# Patient Record
Sex: MALE
[De-identification: names, ages, dates, MRNs, and addresses within clinical notes are randomized per-mention and may not be internally consistent; named-entity substitution may affect disease eponyms.]

---

## 2018-08-14 ENCOUNTER — HOSPITAL ENCOUNTER (EMERGENCY)
Dept: HOSPITAL 14 - H.ER | Age: 38
Discharge: HOME | End: 2018-08-14
Payer: COMMERCIAL

## 2018-08-14 VITALS
TEMPERATURE: 98.2 F | SYSTOLIC BLOOD PRESSURE: 132 MMHG | OXYGEN SATURATION: 100 % | DIASTOLIC BLOOD PRESSURE: 80 MMHG | HEART RATE: 81 BPM

## 2018-08-14 VITALS — RESPIRATION RATE: 16 BRPM

## 2018-08-14 DIAGNOSIS — Z23: ICD-10-CM

## 2018-08-14 DIAGNOSIS — S06.9X0A: ICD-10-CM
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
## 2018-08-14 DIAGNOSIS — S02.80XA: Primary | ICD-10-CM

## 2018-08-14 DIAGNOSIS — H11.30: ICD-10-CM
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
## 2018-08-14 DIAGNOSIS — S00.83XA: ICD-10-CM

## 2018-08-14 DIAGNOSIS — S00.11XA: ICD-10-CM

## 2018-08-14 NOTE — CT
Date of service: 



08/14/2018



PROCEDURE:  CT MAXILLOFACIAL BONES WITHOUT CONTRAST



HISTORY:

s/p head injury playing basketball



COMPARISON:

August 14, 2018. CT head.



TECHNIQUE:

Contiguous axial CT  images of the maxillofacial bones were obtained. 

Coronal and sagittal reformats were generated.



Radiation dose:



Total exam DLP =  750.52 mGy-cm.



This CT exam was performed using one or more of the following dose 

reduction techniques: Automated exposure control, adjustment of the 

mA and/or kV according to patient size, and/or use of iterative 

reconstruction technique.



FINDINGS:



NASAL BONES:

Acute right nasal bone fracture.



There is deviation of the bony nasal septum and there may be a small 

fracture. 



There is nasal turbinates thickening indicative of blood. 



ORBITS:

No evidence of ocular/globe abnormality 



Fractures through the medial wall of the right orbit. 



Multiple fractures including a large free-floating fragment medial 

inferior aspect of the right orbital wall. 



There is no evidence of entrapment of the inferior rectus muscle 

which appears to be in anatomic position in there is a tissue plane 

between the inferior rectus muscle enlarged partially detached 

fragment of the adjacent medial and inferior orbital wall. 



Evidence of subtle nondisplaced fracture through the lateral wall of 

the right orbit 



Focal air identified in the retro Conal region adjacent to the optic 

nerve and this appears to indicate communication between nasal cavity 

and the right orbit. 



PARANASAL SINUSES/ MASTOIDS:

Fluid/ blood identified in right ethmoid air cells. Blood identified 

in the right maxillary sinus. Layering of fluid/ blood in the frontal 

sinus on the right side.



MAXILLA:

Unremarkable.



MANDIBLE/ TEMPOROMANDIBULAR JOINTS:

Unremarkable.



SKULL BASE:

Fractures identified through the lamina papyracea.



Medial and lateral pterygoid plates and adjacent musculature are 

unremarkable, unaffected by these fractures. 



TEMPORAL BONES:

Middle ears and mastoid grossly unremarkable.



OTHER FINDINGS:

None.



IMPRESSION:

Complex fractures including the medial wall of the right orbit, the 

inferior wall of the right orbit.  Fractures extend through the right 

nasal bone, possibly the bony nasal septum.  Fractures through the 

lamina papyracea identified. In addition, there is a nondisplaced 

fracture of the lateral wall of the right orbit. 



Blood identified nasal turbinates, right maxillary sinus, right 

ethmoid air cells extending through the frontoethmoidal recess with 

fluid/ blood in the right frontal sinus.

## 2018-08-14 NOTE — CT
Date of service: 



08/14/2018



PROCEDURE:  CT HEAD WITHOUT CONTRAST.



HISTORY:

s/p head injury playing basketball



COMPARISON:

05/23/2012.



TECHNIQUE:

Axial computed tomography images were obtained through the head/brain 

without intravenous contrast.  



Radiation dose:



Total exam DLP = 799.22 mGy-cm.



This CT exam was performed using one or more of the following dose 

reduction techniques: Automated exposure control, adjustment of the 

mA and/or kV according to patient size, and/or use of iterative 

reconstruction technique.



FINDINGS:



HEMORRHAGE:

No intracranial hemorrhage. 



BRAIN:

No mass effect or edema.  No atrophy or chronic microvascular 

ischemic changes.



VENTRICLES:

Unremarkable. No hydrocephalus. 



CALVARIUM:

Unremarkable.



PARANASAL SINUSES:

Unremarkable as visualized. No significant inflammatory changes.



MASTOID AIR CELLS:

Unremarkable as visualized. No inflammatory changes.



OTHER FINDINGS:

Complex facial fractures which are incompletely visualized.  These 

involve the floor of the right orbit, the medial wall of the right 

orbit. Fractures of the lamina papyracea also identified. 



Fluid/blood identified in maxillary sinus on the right as well as the 

ethmoid air cells and right frontal air cells and frontoethmoidal 

recess region. 



IMPRESSION:

No acute intracranial abnormalities. No significant findings to 

account for the clinical presentation.  Complex right periorbital and 

facial fractures identified although there are incompletely visualized

## 2018-08-14 NOTE — ED PDOC
HPI: Eye Injury/Pain


Time Seen by Provider: 18 17:54


Chief Complaint (Nursing): Eye Problem


Chief Complaint (Provider): Right Eye Pain and Right Sided facial Pain


History Per:  (70460)


History/Exam Limitations: no limitations


Onset/Duration Of Symptoms: Days


Current Symptoms Are (Timing): Still Present


Injury To Eye?: No


Severity: None


Quality: "Pain"


Wears Contact Lens?: No


Associated Symptoms: Pain.  denies: FB Sensation, Discharge From Eye


Additional Complaint(s): 


37 year old male presents to the emergency department to be evaluated for right 

sided facial pain and right eye pain. Patient reports that on  while 

playing basketball he was elbowed in the face and has been experiencing 

worsening pain and swelling ever since. He further states that he has been 

experiencing intermittent blurry vision from his right eye. Patient also notes 

mild headache associated with symptoms. Denies loss of consciousness, dizziness

, nausea, vomiting, foreign body sensation to right eye. No medications were 

taken prior to arrival.





NO PRIMARY CARE PROVIDER








Past Medical History


Reviewed: Historical Data, Nursing Documentation, Vital Signs


Vital Signs: 


 Last Vital Signs











Temp  99.4 F   18 17:50


 


Pulse  84   18 17:50


 


Resp  16   18 17:50


 


BP  147/90   18 17:50


 


Pulse Ox  98   18 17:50














- Medical History


PMH: Gastritis





- Surgical History


Surgical History: No Surg Hx





- Family History


Family History: States: Unknown Family Hx





- Social History


Current smoker - smoking cessation education provided: No


Alcohol: Social


Drugs: Denies





- Home Medications


Home Medications: 


 Ambulatory Orders











 Medication  Instructions  Recorded


 


Acetaminophen [Acetaminophen 8 650 mg PO Q8 PRN #21 tablet.er 18





Hour]  


 


traMADol [Ultram] 50 mg PO TID PRN #12 tab 18














- Allergies


Allergies/Adverse Reactions: 


 Allergies











Allergy/AdvReac Type Severity Reaction Status Date / Time


 


No Known Allergies Allergy   Verified 18 17:50














Review of Systems


Constitutional: Positive for: Other (right sided facial pain)


Eyes: Positive for: Pain (right eye )


Gastrointestinal: Negative for: Nausea, Vomiting


Neurological: Negative for: Dizziness





Physical Exam





- Reviewed


Nursing Documentation Reviewed: Yes


Vital Signs Reviewed: Yes





- Physical Exam


Comments: 


GENERAL APPEARANCE: Patient is awake, alert, oriented x 3, in no acute 

distress. 


SKIN:  Warm, dry; (-) cyanosis; (-) rash.


HEAD:  (-) scalp swelling or tenderness, (-) temporal artery tenderness.


EYES:  (-) conjunctival pallor, (-) scleral icterus, diffuse ecchymosis and 

tenderness of right eye orbit, conjunctival hyphemia to the lateral sclera of 

right eye, (-) fluorescein uptake.


ENMT:  (-) sinus tenderness; mucous membranes are moist, right maxillary 

tenderness, full ROM of jaw, dentition intact and non tender, pharynx clear, 

uvual midline


NECK:  (-) tenderness, (-) stiffness, (-) meningismus, (-) lymphadenopathy.


CHEST AND RESPIRATORY:  (-) rales, (-) rhonchi, (-) wheezes; breath sounds 

equal bilaterally.


HEART AND CARDIOVASCULAR:  (-) irregularity; (-) murmur, (-) gallop.


ABDOMEN AND GI:  Soft; (-) tenderness.


EXTREMITIES:  (-) deformity.


NEURO AND PSYCH:  Mental status as above.  CNs:  Pupils equal and reactive; EOMI

; (-) facial asymmetry; tongue and uvula midline.  Strength and DTRs symmetric. 








- ECG


O2 Sat by Pulse Oximetry: 98 (RA)


Pulse Ox Interpretation: Normal





Medical Decision Making


Medical Decision Makin


Initial Impression


37 year old male presenting with closed head injury, facial contusion r/o 

fracture 


Initial Plan:


* CT head w/o Contrast 


* CT maxillofacial w/o contrast 


* Adacel 0.5 ml 


* Tylenol 650 mg PO


* Flucaine eye drops 1 drop OD


* Reevaluation


 





Date of service: 


2018


PROCEDURE:  CT HEAD WITHOUT CONTRAST.


HISTORY:


s/p head injury playing basketball


COMPARISON:


2012.


TECHNIQUE:


Axial computed tomography images were obtained through the head/brain without 

intravenous contrast.  


Radiation dose:


Total exam DLP = 799.22 mGy-cm.


This CT exam was performed using one or more of the following dose reduction 

techniques: Automated exposure control, adjustment of the mA and/or kV 

according to patient size, and/or use of iterative reconstruction technique.


FINDINGS:


HEMORRHAGE:


No intracranial hemorrhage. 


BRAIN:


No mass effect or edema.  No atrophy or chronic microvascular ischemic changes.


VENTRICLES:


Unremarkable. No hydrocephalus. 


CALVARIUM:


Unremarkable.


PARANASAL SINUSES:


Unremarkable as visualized. No significant inflammatory changes.


MASTOID AIR CELLS:


Unremarkable as visualized. No inflammatory changes.


OTHER FINDINGS:


Complex facial fractures which are incompletely visualized.  These involve the 

floor of the right orbit, the medial wall of the right orbit. Fractures of the 

lamina papyracea also identified. 


Fluid/blood identified in maxillary sinus on the right as well as the ethmoid 

air cells and right frontal air cells and frontoethmoidal recess region. 


IMPRESSION:


No acute intracranial abnormalities. No significant findings to account for the 

clinical presentation.  Complex right periorbital and facial fractures 

identified although there are incompletely visualized





Date of service: 


2018


PROCEDURE:  CT MAXILLOFACIAL BONES WITHOUT CONTRAST


HISTORY:


s/p head injury playing basketball


COMPARISON:


2018. CT head.


TECHNIQUE:


Contiguous axial CT  images of the maxillofacial bones were obtained. Coronal 

and sagittal reformats were generated.


Radiation dose:


Total exam DLP =  750.52 mGy-cm.


This CT exam was performed using one or more of the following dose reduction 

techniques: Automated exposure control, adjustment of the mA and/or kV 

according to patient size, and/or use of iterative reconstruction technique.


FINDINGS:


NASAL BONES:


Acute right nasal bone fracture.


There is deviation of the bony nasal septum and there may be a small fracture. 


There is nasal turbinates thickening indicative of blood. 


ORBITS:


No evidence of ocular/globe abnormality 


Fractures through the medial wall of the right orbit. 


Multiple fractures including a large free-floating fragment medial inferior 

aspect of the right orbital wall. 


There is no evidence of entrapment of the inferior rectus muscle which appears 

to be in anatomic position in there is a tissue plane between the inferior 

rectus muscle enlarged partially detached fragment of the adjacent medial and 

inferior orbital wall. 


Evidence of subtle nondisplaced fracture through the lateral wall of the right 

orbit 


Focal air identified in the retro Conal region adjacent to the optic nerve and 

this appears to indicate communication between nasal cavity and the right 

orbit. 


PARANASAL SINUSES/ MASTOIDS:


Fluid/ blood identified in right ethmoid air cells. Blood identified in the 

right maxillary sinus. Layering of fluid/ blood in the frontal sinus on the 

right side.


MAXILLA:


Unremarkable.


MANDIBLE/ TEMPOROMANDIBULAR JOINTS:


Unremarkable.


SKULL BASE:


Fractures identified through the lamina papyracea.


Medial and lateral pterygoid plates and adjacent musculature are unremarkable, 

unaffected by these fractures. 


TEMPORAL BONES:


Middle ears and mastoid grossly unremarkable.


OTHER FINDINGS:


None.


IMPRESSION:


Complex fractures including the medial wall of the right orbit, the inferior 

wall of the right orbit.  Fractures extend through the right nasal bone, 

possibly the bony nasal septum.  Fractures through the lamina papyracea 

identified. In addition, there is a nondisplaced fracture of the lateral wall 

of the right orbit. 


Blood identified nasal turbinates, right maxillary sinus, right ethmoid air 

cells extending through the frontoethmoidal recess with fluid/ blood in the 

right frontal sinus.








[facility]








Case discussed with Dr Thorpe, OMFS at [facility], who recommends outpatient 

follow up this Monday, 18 in OMFS clinic from -. Requests patient be 

supplied with CT images on CD.








On re-evaluation, patient reports improvement of symptoms. On exam, patient 

remains AAOx3, in no acute distress. On exam, neck is supple, lungs CTA, 

cardiac RRR, neuro exam shows no focal findings. VSS, stable for discharge. 

Educated on sinus guarding. Patient given CT reports/images for follow up.


Diagnostic results d/w the patient in great detail. Dx of facial/orbital 

fractures d/w the patient. 


Based on history, exam and diagnostic results plan will be for discharge and 

outpatient OMFS follow up.


Advised to follow up with primary care physician in 1-2 days without fail. 

Advised to take medication as prescribed. Return to the emergency room at any 

time for any new or worsening symptoms.


Patient states he fully agrees with and understands discharge instructions. 

States that he agrees with the plan and disposition. Verbalized and repeated 

discharge instructions and plan. I have given the patient opportunity to ask 

any additional questions.


--------------------------------------------------------------------------------

--------


Documented by Hallie Porter acting as a scribe for Va Herron PA-C. 





All medical record entries made by the Scribe were at my direction and 

personally dictated by me. I have reviewed the chart and agree that the record 

accurately reflects my personal performance of the history, physical exam, 

medical decision making, and the department course for this patient. I have 

also personally directed, reviewed, and agree with the discharge instructions 

and disposition.














Disposition





- Clinical Impression


Clinical Impression: 


 Orbital fracture, Facial contusion, Conjunctival hemorrhage, Traumatic 

ecchymosis of right orbit








- Patient ED Disposition


Is Patient to be Admitted: No


Counseled Patient/Family Regarding: Studies Performed, Diagnosis, Need For 

Followup, Rx Given





- Disposition


Disposition: Routine/Home


Disposition Time: 19:47


Condition: STABLE


Additional Instructions: 


[facility] [place] [facility]


[address] [place] [address]


Phone: [phone]





Programe dago iron para el 2018.








La atencin mdica de emergencia que recibi hoy estaba dirigida a bibi sntomas 

agudos. Si le prescribieron algn medicamento, llnelo y tome segn las 

indicaciones. Bibi sntomas pueden tardar varios silveira en resolverse. Regrese al 

Departamento de Emergencia si bibi sntomas empeoran, no mejoran o si tiene alg

n otro problema.





Comunquese con chavez mdico en 2 silveira para dago reevaluacin y seguimiento / o 

llame a mejia de los mdicos / clnicas a los que ha referido y que figura en el 

formulario de Informacin de visitas del paciente que se incluye en chavez paquete 

de annel. Traiga todos los documentos que recibi al momento del annel junto con 

los medicamentos que est tomando en chavez visita de seguimiento. Nuestro 

tratamiento no puede reemplazar la atencin mdica en curso por parte de un 

proveedor de atencin primaria (PCP) fuera del departamento de emergencias.


Prescriptions: 


Acetaminophen [Acetaminophen 8 Hour] 650 mg PO Q8 PRN #21 tablet.er


 PRN Reason: Pain, Moderate (4-7)


traMADol [Ultram] 50 mg PO TID PRN #12 tab


 PRN Reason: Pain, Severe (8-10)


Instructions:  Black Eye, Skull and Facial Fractures, Eye Contusion (DC), 

Subconjunctival Hemorrhage


Forms:  [facility] ([place])


Print Language: Arabic





- POA


Present On Arrival: Falls Or Trauma